# Patient Record
Sex: MALE | Race: BLACK OR AFRICAN AMERICAN | Employment: OTHER | URBAN - METROPOLITAN AREA
[De-identification: names, ages, dates, MRNs, and addresses within clinical notes are randomized per-mention and may not be internally consistent; named-entity substitution may affect disease eponyms.]

---

## 2017-10-31 ENCOUNTER — HOSPITAL ENCOUNTER (EMERGENCY)
Age: 32
Discharge: HOME | End: 2017-10-31
Attending: EMERGENCY MEDICINE
Payer: SELF-PAY

## 2017-10-31 ENCOUNTER — APPOINTMENT (OUTPATIENT)
Dept: GENERAL RADIOLOGY | Age: 32
End: 2017-10-31
Attending: EMERGENCY MEDICINE
Payer: SELF-PAY

## 2017-10-31 VITALS
HEIGHT: 68 IN | TEMPERATURE: 98.3 F | OXYGEN SATURATION: 100 % | HEART RATE: 72 BPM | SYSTOLIC BLOOD PRESSURE: 125 MMHG | DIASTOLIC BLOOD PRESSURE: 83 MMHG | BODY MASS INDEX: 23.25 KG/M2 | RESPIRATION RATE: 16 BRPM | WEIGHT: 153.38 LBS

## 2017-10-31 DIAGNOSIS — S16.1XXA STRAIN OF NECK MUSCLE, INITIAL ENCOUNTER: Primary | ICD-10-CM

## 2017-10-31 PROCEDURE — 71020 XR CHEST PA LAT: CPT

## 2017-10-31 PROCEDURE — 72050 X-RAY EXAM NECK SPINE 4/5VWS: CPT

## 2017-10-31 PROCEDURE — 76010010392 HC REMOVAL IMPACTED WAX IRRIGATION/LVG UNI

## 2017-10-31 PROCEDURE — 99282 EMERGENCY DEPT VISIT SF MDM: CPT

## 2017-10-31 RX ORDER — DOCUSATE SODIUM 50 MG/5ML
100 LIQUID ORAL
Status: DISCONTINUED | OUTPATIENT
Start: 2017-10-31 | End: 2017-10-31
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
## 2020-10-06 ENCOUNTER — APPOINTMENT (OUTPATIENT)
Dept: GENERAL RADIOLOGY | Age: 35
End: 2020-10-06
Attending: EMERGENCY MEDICINE
Payer: SELF-PAY

## 2020-10-06 ENCOUNTER — HOSPITAL ENCOUNTER (EMERGENCY)
Age: 35
Discharge: HOME | End: 2020-10-06
Attending: EMERGENCY MEDICINE | Admitting: EMERGENCY MEDICINE
Payer: SELF-PAY

## 2020-10-06 VITALS
TEMPERATURE: 98.2 F | HEART RATE: 84 BPM | DIASTOLIC BLOOD PRESSURE: 79 MMHG | SYSTOLIC BLOOD PRESSURE: 135 MMHG | OXYGEN SATURATION: 99 % | RESPIRATION RATE: 18 BRPM

## 2020-10-06 DIAGNOSIS — S43.014A ANTERIOR SHOULDER DISLOCATION, RIGHT, INITIAL ENCOUNTER: Primary | ICD-10-CM

## 2020-10-06 PROCEDURE — 73030 X-RAY EXAM OF SHOULDER: CPT

## 2020-10-06 PROCEDURE — 99283 EMERGENCY DEPT VISIT LOW MDM: CPT

## 2020-10-06 NOTE — ED PROVIDER NOTES
HPI patient has history of back pain and sees a chiropractor once a month. He has had no previous shoulder dislocations. Patient was laughing and lost his balance falling backwards onto both outstretched hands. He has severe pain in his right shoulder. Past Medical History:   Diagnosis Date    Asthma     Pneumonia        History reviewed. No pertinent surgical history. Family History:   Problem Relation Age of Onset    Asthma Mother        Social History     Socioeconomic History    Marital status: SINGLE     Spouse name: Not on file    Number of children: Not on file    Years of education: Not on file    Highest education level: Not on file   Occupational History    Not on file   Social Needs    Financial resource strain: Not on file    Food insecurity     Worry: Not on file     Inability: Not on file    Transportation needs     Medical: Not on file     Non-medical: Not on file   Tobacco Use    Smoking status: Former Smoker   Substance and Sexual Activity    Alcohol use: Yes     Comment: occassional    Drug use: Not on file    Sexual activity: Not on file   Lifestyle    Physical activity     Days per week: Not on file     Minutes per session: Not on file    Stress: Not on file   Relationships    Social connections     Talks on phone: Not on file     Gets together: Not on file     Attends [employer] service: Not on file     Active member of club or organization: Not on file     Attends meetings of clubs or organizations: Not on file     Relationship status: Not on file    Intimate partner violence     Fear of current or ex partner: Not on file     Emotionally abused: Not on file     Physically abused: Not on file     Forced sexual activity: Not on file   Other Topics Concern    Not on file   Social History Narrative    Not on file         ALLERGIES: Patient has no known allergies. Review of Systems   All other systems reviewed and are negative.       Vitals:    10/06/20 1841   BP: 138/81   Pulse: 88   Resp: 18   Temp: 98.1 °F (36.7 °C)   SpO2: 99%            Physical Exam  Vitals signs and nursing note reviewed. Constitutional:       Appearance: He is well-developed. HENT:      Head: Normocephalic and atraumatic. Eyes:      Pupils: Pupils are equal, round, and reactive to light. Neck:      Musculoskeletal: Normal range of motion and neck supple. Cardiovascular:      Rate and Rhythm: Normal rate and regular rhythm. Heart sounds: Normal heart sounds. No murmur. No friction rub. No gallop. Pulmonary:      Effort: Pulmonary effort is normal. No respiratory distress. Breath sounds: No wheezing or rales. Abdominal:      Palpations: Abdomen is soft. Tenderness: There is no abdominal tenderness. There is no rebound. Musculoskeletal: Normal range of motion. General: No tenderness. Comments: Right shoulder deformity   Skin:     Findings: No erythema. Neurological:      Mental Status: He is alert. Cranial Nerves: No cranial nerve deficit. Comments: Motor; symmetric   Psychiatric:         Behavior: Behavior normal.          MDM       Procedures            Note: Patient's shoulder reduced spontaneously when I walked in the room.   .me  7:15 PM

## 2020-10-06 NOTE — ED NOTES
Pt placed on stretcher from wheelchair. Pt arrives c/o RIGHT shoulder pain and dislocation after falling backward on arm. While repositioning pt on stretcher pt reports \"pop\" in shoulder with immediate pain relief. Dr. Wicho Jha at bedside at the time.

## 2020-10-07 NOTE — ED NOTES
NAD, VSS, no new rx, ambulatory, baseline mentation, no questions r/t dci, sling applied to R shoulder

## 2020-10-07 NOTE — DISCHARGE INSTRUCTIONS
Dislocated Shoulder: Care Instructions  Your Care Instructions     When the upper arm comes out of the shoulder socket, it is called a dislocated shoulder. After the doctor puts the shoulder back in place, he or she may put your arm in a sling or shoulder immobilizer. This will keep it from moving. Exercise and physical therapy can help your shoulder get strong and move normally again. It may take up to a year for your shoulder to heal and be free of pain. If your shoulder keeps coming out of place, talk to your doctor about surgery. It can prevent dislocations. You may have had a sedative to help you relax. You may be unsteady after having sedation. It can take a few hours for the medicine's effects to wear off. Common side effects of sedation include nausea, vomiting, and feeling sleepy or tired. The doctor has checked you carefully, but problems can develop later. If you notice any problems or new symptoms, get medical treatment right away. Follow-up care is a key part of your treatment and safety. Be sure to make and go to all appointments, and call your doctor if you are having problems. It's also a good idea to know your test results and keep a list of the medicines you take. How can you care for yourself at home? · If the doctor gave you a sedative:  ? For 24 hours, don't do anything that requires attention to detail. This includes going to work, making important decisions, or signing any legal documents. It takes time for the medicine's effects to completely wear off.  ? For your safety, do not drive or operate any machinery that could be dangerous. Wait until the medicine wears off and you can think clearly and react easily. · If your doctor put your arm in a sling or shoulder immobilizer, wear it as directed. · Take pain medicines exactly as directed. ? If the doctor gave you a prescription medicine for pain, take it as prescribed.   ? If you are not taking a prescription pain medicine, ask your doctor if you can take an over-the-counter medicine. · Put ice or a cold pack on your shoulder for 10 to 20 minutes at a time. Try to do this every 1 to 2 hours for the next 3 days (when you are awake). Put a thin cloth between the ice and your skin. · You may use warm packs after the first 3 days for 15 to 20 minutes at a time. This can ease pain. · If your doctor gave you exercises to do at home, do them exactly as your doctor told you. · Do not do anything that makes the pain worse. When should you call for help? Call 911 anytime you think you may need emergency care. For example, call if:    · You have trouble breathing.     · You passed out (lost consciousness). Call your doctor now or seek immediate medical care if:    · You have new or worse nausea or vomiting.     · You have new or worse pain.     · Your hand or fingers are cool or pale or change color.     · You have tingling, weakness, or numbness in your hand or fingers. Watch closely for changes in your health, and be sure to contact your doctor if:    · You do not get better as expected. Where can you learn more? Go to http://www.gray.com/  Enter L766 in the search box to learn more about \"Dislocated Shoulder: Care Instructions. \"  Current as of: March 2, 2020               Content Version: 12.6  © 6581-4888 [employer]. Care instructions adapted under license by [employer] (which disclaims liability or warranty for this information). If you have questions about a medical condition or this instruction, always ask your healthcare professional. [place] [address] any warranty or liability for your use of this information. Patient Education        Dislocated Shoulder: Care Instructions  Your Care Instructions     When the upper arm comes out of the shoulder socket, it is called a dislocated shoulder.   After the doctor puts the shoulder back in place, he or she may put your arm in a sling or shoulder immobilizer. This will keep it from moving. Exercise and physical therapy can help your shoulder get strong and move normally again. It may take up to a year for your shoulder to heal and be free of pain. If your shoulder keeps coming out of place, talk to your doctor about surgery. It can prevent dislocations. You may have had a sedative to help you relax. You may be unsteady after having sedation. It can take a few hours for the medicine's effects to wear off. Common side effects of sedation include nausea, vomiting, and feeling sleepy or tired. The doctor has checked you carefully, but problems can develop later. If you notice any problems or new symptoms, get medical treatment right away. Follow-up care is a key part of your treatment and safety. Be sure to make and go to all appointments, and call your doctor if you are having problems. It's also a good idea to know your test results and keep a list of the medicines you take. How can you care for yourself at home? · If the doctor gave you a sedative:  ? For 24 hours, don't do anything that requires attention to detail. This includes going to work, making important decisions, or signing any legal documents. It takes time for the medicine's effects to completely wear off.  ? For your safety, do not drive or operate any machinery that could be dangerous. Wait until the medicine wears off and you can think clearly and react easily. · If your doctor put your arm in a sling or shoulder immobilizer, wear it as directed. · Take pain medicines exactly as directed. ? If the doctor gave you a prescription medicine for pain, take it as prescribed. ? If you are not taking a prescription pain medicine, ask your doctor if you can take an over-the-counter medicine. · Put ice or a cold pack on your shoulder for 10 to 20 minutes at a time. Try to do this every 1 to 2 hours for the next 3 days (when you are awake).  Put a thin cloth between the ice and your skin. · You may use warm packs after the first 3 days for 15 to 20 minutes at a time. This can ease pain. · If your doctor gave you exercises to do at home, do them exactly as your doctor told you. · Do not do anything that makes the pain worse. When should you call for help? Call 911 anytime you think you may need emergency care. For example, call if:    · You have trouble breathing.     · You passed out (lost consciousness). Call your doctor now or seek immediate medical care if:    · You have new or worse nausea or vomiting.     · You have new or worse pain.     · Your hand or fingers are cool or pale or change color.     · You have tingling, weakness, or numbness in your hand or fingers. Watch closely for changes in your health, and be sure to contact your doctor if:    · You do not get better as expected. Where can you learn more? Go to http://www.gray.com/  Enter L766 in the search box to learn more about \"Dislocated Shoulder: Care Instructions. \"  Current as of: March 2, 2020               Content Version: 12.6  © 1276-9756 [employer]. Care instructions adapted under license by [employer] (which disclaims liability or warranty for this information). If you have questions about a medical condition or this instruction, always ask your healthcare professional. [address] any warranty or liability for your use of this information.

## 2023-01-24 ENCOUNTER — OFFICE VISIT (OUTPATIENT)
Dept: FAMILY MEDICINE CLINIC | Age: 38
End: 2023-01-24
Payer: COMMERCIAL

## 2023-01-24 VITALS
BODY MASS INDEX: 23.13 KG/M2 | OXYGEN SATURATION: 98 % | RESPIRATION RATE: 20 BRPM | HEIGHT: 69 IN | DIASTOLIC BLOOD PRESSURE: 81 MMHG | WEIGHT: 156.2 LBS | HEART RATE: 72 BPM | SYSTOLIC BLOOD PRESSURE: 125 MMHG | TEMPERATURE: 98.1 F

## 2023-01-24 DIAGNOSIS — M54.9 CHRONIC BACK PAIN, UNSPECIFIED BACK LOCATION, UNSPECIFIED BACK PAIN LATERALITY: Primary | ICD-10-CM

## 2023-01-24 DIAGNOSIS — G89.29 CHRONIC BACK PAIN, UNSPECIFIED BACK LOCATION, UNSPECIFIED BACK PAIN LATERALITY: Primary | ICD-10-CM

## 2023-01-24 PROCEDURE — 99203 OFFICE O/P NEW LOW 30 MIN: CPT | Performed by: STUDENT IN AN ORGANIZED HEALTH CARE EDUCATION/TRAINING PROGRAM
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
